# Patient Record
Sex: FEMALE | Race: WHITE | NOT HISPANIC OR LATINO
[De-identification: names, ages, dates, MRNs, and addresses within clinical notes are randomized per-mention and may not be internally consistent; named-entity substitution may affect disease eponyms.]

---

## 2021-12-07 PROBLEM — Z00.00 ENCOUNTER FOR PREVENTIVE HEALTH EXAMINATION: Status: ACTIVE | Noted: 2021-12-07

## 2021-12-08 ENCOUNTER — NON-APPOINTMENT (OUTPATIENT)
Age: 31
End: 2021-12-08

## 2021-12-09 ENCOUNTER — NON-APPOINTMENT (OUTPATIENT)
Age: 31
End: 2021-12-09

## 2021-12-09 ENCOUNTER — APPOINTMENT (OUTPATIENT)
Dept: RHEUMATOLOGY | Facility: CLINIC | Age: 31
End: 2021-12-09
Payer: MEDICAID

## 2021-12-09 VITALS
OXYGEN SATURATION: 98 % | SYSTOLIC BLOOD PRESSURE: 99 MMHG | HEIGHT: 65 IN | HEART RATE: 71 BPM | DIASTOLIC BLOOD PRESSURE: 66 MMHG | TEMPERATURE: 97.4 F | WEIGHT: 165 LBS | BODY MASS INDEX: 27.49 KG/M2

## 2021-12-09 PROCEDURE — 99203 OFFICE O/P NEW LOW 30 MIN: CPT | Mod: 25

## 2021-12-09 NOTE — HISTORY OF PRESENT ILLNESS
[FreeTextEntry1] : This is a 31-year-old woman she comes with her  she is concerned about the possibility of arthritis she has had psoriasis involving her scalp for a long period of time but more recently has had musculoskeletal complaints she reports that about a year ago she twisted her left foot it hurt in and around the pinky area she eventually went and sore a primary care physician no x-ray was done because at that point she was pregnant she was basically off her feet during pregnancy without any pain but has had return of pain now postpartum she is now 5 months postpartum she is breast-feeding she has had some left foot pain as well as some right foot pain she is also had wrist and hand pain she has had no joint swelling she has not taken any specific medication as noted she is 5 months postpartum she is currently breast-feeding she not on any other medications except for some multivitamins

## 2021-12-09 NOTE — PHYSICAL EXAM
[General Appearance - Alert] : alert [General Appearance - In No Acute Distress] : in no acute distress [General Appearance - Well Nourished] : well nourished [General Appearance - Well Developed] : well developed [Abnormal Walk] : normal gait [Nail Clubbing] : no clubbing  or cyanosis of the fingernails [Musculoskeletal - Swelling] : no joint swelling seen [FreeTextEntry1] : no tender or swollen joints no synovitis no sausage and of digits nail appear normal

## 2021-12-09 NOTE — REVIEW OF SYSTEMS
[Feeling Tired] : feeling tired [As Noted in HPI] : as noted in HPI [Feeling Poorly] : not feeling poorly

## 2021-12-09 NOTE — ASSESSMENT
[FreeTextEntry1] : 31-year-old woman comes with her  she is 5 months postpartum she has a history of psoriasis but has had more recent joint pains arthralgias involving feet and hands no actual swelling no synovitis I am obtaining some blood work as outlined which I will review with patients I did suggest observation at this point she may consider taking ibuprofen if needed but should be checking with child's pediatrician first will be speaking to her when bloods return

## 2021-12-10 LAB
ALBUMIN SERPL ELPH-MCNC: 4.6 G/DL
ALP BLD-CCNC: 78 U/L
ALT SERPL-CCNC: 7 U/L
ANION GAP SERPL CALC-SCNC: 12 MMOL/L
AST SERPL-CCNC: 9 U/L
BASOPHILS # BLD AUTO: 0.02 K/UL
BASOPHILS NFR BLD AUTO: 0.3 %
BILIRUB SERPL-MCNC: 0.4 MG/DL
BUN SERPL-MCNC: 9 MG/DL
CALCIUM SERPL-MCNC: 9.7 MG/DL
CHLORIDE SERPL-SCNC: 104 MMOL/L
CO2 SERPL-SCNC: 25 MMOL/L
CREAT SERPL-MCNC: 0.73 MG/DL
CRP SERPL-MCNC: <3 MG/L
EOSINOPHIL # BLD AUTO: 0.12 K/UL
EOSINOPHIL NFR BLD AUTO: 1.8 %
ERYTHROCYTE [SEDIMENTATION RATE] IN BLOOD BY WESTERGREN METHOD: 13 MM/HR
GLUCOSE SERPL-MCNC: 84 MG/DL
HCT VFR BLD CALC: 44.8 %
HGB BLD-MCNC: 14.4 G/DL
IMM GRANULOCYTES NFR BLD AUTO: 0.3 %
LYMPHOCYTES # BLD AUTO: 2.22 K/UL
LYMPHOCYTES NFR BLD AUTO: 32.4 %
MAN DIFF?: NORMAL
MCHC RBC-ENTMCNC: 30.3 PG
MCHC RBC-ENTMCNC: 32.1 GM/DL
MCV RBC AUTO: 94.1 FL
MONOCYTES # BLD AUTO: 0.43 K/UL
MONOCYTES NFR BLD AUTO: 6.3 %
NEUTROPHILS # BLD AUTO: 4.04 K/UL
NEUTROPHILS NFR BLD AUTO: 58.9 %
PLATELET # BLD AUTO: 232 K/UL
POTASSIUM SERPL-SCNC: 4 MMOL/L
PROT SERPL-MCNC: 6.7 G/DL
RBC # BLD: 4.76 M/UL
RBC # FLD: 12.5 %
RHEUMATOID FACT SER QL: 15 IU/ML
SODIUM SERPL-SCNC: 141 MMOL/L
TSH SERPL-ACNC: 1.16 UIU/ML
WBC # FLD AUTO: 6.85 K/UL

## 2021-12-11 LAB
CCP AB SER IA-ACNC: <8 UNITS
RF+CCP IGG SER-IMP: NEGATIVE

## 2021-12-15 ENCOUNTER — NON-APPOINTMENT (OUTPATIENT)
Age: 31
End: 2021-12-15

## 2022-05-12 ENCOUNTER — APPOINTMENT (OUTPATIENT)
Dept: RHEUMATOLOGY | Facility: CLINIC | Age: 32
End: 2022-05-12
Payer: MEDICAID

## 2022-05-12 ENCOUNTER — NON-APPOINTMENT (OUTPATIENT)
Age: 32
End: 2022-05-12

## 2022-05-12 VITALS
DIASTOLIC BLOOD PRESSURE: 64 MMHG | OXYGEN SATURATION: 98 % | HEIGHT: 63 IN | HEART RATE: 69 BPM | BODY MASS INDEX: 26.75 KG/M2 | WEIGHT: 151 LBS | SYSTOLIC BLOOD PRESSURE: 115 MMHG | TEMPERATURE: 98.4 F

## 2022-05-12 DIAGNOSIS — M25.50 PAIN IN UNSPECIFIED JOINT: ICD-10-CM

## 2022-05-12 PROCEDURE — 99213 OFFICE O/P EST LOW 20 MIN: CPT | Mod: 25

## 2022-05-12 NOTE — DATA REVIEWED
[FreeTextEntry1] : In addition to the above data I did review the x-ray film and report of her left foot this appears normal also reviewed the x-ray of her pelvis and her report did show the possibility of some sclerosis may be degenerative may be sacroiliitis however these changes I think can also be seen in pregnancy she did have 5 pregnancy

## 2022-05-12 NOTE — HISTORY OF PRESENT ILLNESS
[FreeTextEntry1] : pain in foot dilma , neck - all the time - worse with stress a little worse in am - psoriasis a little worse - takes motrin it helps - 10 MONTHS POST PARTUM - SX X 2 YEARS this is a 31-year-old woman she returns for a follow-up visit she is now 10 months postpartum she continues to have musculoskeletal complaints all worse when she is under stress she is breast-feeding she does take occasional ibuprofen which does help a little bit worse pains appear to be in her neck although there is no limitations of neck motion she also has pain in her left foot as well as her lower back area at times again there may be some slight pain in the morning she also has ankle pain and knee pain but has not had any evidence of any swelling or deformities of any joints she remains concerned regarding she did have an x-ray done of her left foot which I have reviewed she also had an x-ray done of her SI joints which I have also reviewed or her pelvis she has been given some topicals for psoriasis she apparently has it in her scalp and head and neck area she has additional concerns regarding use of functional medicines and functional medication approaches she does report in the past when she changed her diet and increased activity there was improvement in symptoms again her musculoskeletal symptoms have been longstanding

## 2022-05-12 NOTE — PHYSICAL EXAM
[General Appearance - Alert] : alert [General Appearance - In No Acute Distress] : in no acute distress [General Appearance - Well Nourished] : well nourished [General Appearance - Well Developed] : well developed [Neck Appearance] : the appearance of the neck was normal [] : the neck was supple [Nail Clubbing] : no clubbing  or cyanosis of the fingernails [Musculoskeletal - Swelling] : no joint swelling seen [FreeTextEntry1] : No nail change

## 2022-05-12 NOTE — ASSESSMENT
[FreeTextEntry1] : This is a 31-year-old woman she returns again with her  she has had a history of psoriasis in the past mostly in her scalp no nail changes she has longstanding musculoskeletal complaints fairly widespread in hands feet knees and ankles as well as worse pain in her neck she has had no deformities or evidence of inflamed swollen joints she does take Motrin although limited use because she is breast-feeding but this does provide some relief she had blood work done by me about 6 months ago which showed negative sed rate and CRP rheumatoid factor was borderline positive not significant thyroid studies were normal she comes again now because she had x-rays of her foot which is normal she had x-rays of her pelvis which raised the possibility of some sclerosis of the SI joints however I suspect that this may be suggest reflecting her history of 5 pregnancies and now she is 10 months postpartum for completeness I am doing an HLA-B27 although the significance of this may be limited and I reviewed this with her I am also repeating a sed rate and CRP I will be reviewing all of these when they return I did suggest there may be a component of a fibromyalgia-like process I suggested we may consider physical therapy at [facility] for her neck I also suggested that she may try the functional medications or functional approach that she has been told about I will be reviewing bloods with her when they return these bloods will be obtained in the office at the time of visit I also advised her to try to do some relaxation exercises as she describes worse pain when she is under stress

## 2022-05-12 NOTE — REVIEW OF SYSTEMS
[Feeling Tired] : feeling tired [Arthralgias] : arthralgias [Joint Pain] : joint pain [As Noted in HPI] : as noted in HPI [Joint Swelling] : no joint swelling [FreeTextEntry2] : Poor sleeping [de-identified] : Psoriasis

## 2022-05-13 LAB
CRP SERPL-MCNC: <3 MG/L
ERYTHROCYTE [SEDIMENTATION RATE] IN BLOOD BY WESTERGREN METHOD: 9 MM/HR

## 2022-05-19 ENCOUNTER — NON-APPOINTMENT (OUTPATIENT)
Age: 32
End: 2022-05-19

## 2022-05-20 LAB — HLA-B27 RELATED AG QL: NEGATIVE

## 2022-05-25 ENCOUNTER — NON-APPOINTMENT (OUTPATIENT)
Age: 32
End: 2022-05-25

## 2022-06-02 ENCOUNTER — TRANSCRIPTION ENCOUNTER (OUTPATIENT)
Age: 32
End: 2022-06-02

## 2023-11-06 ENCOUNTER — RESULT REVIEW (OUTPATIENT)
Age: 33
End: 2023-11-06

## 2023-11-07 ENCOUNTER — APPOINTMENT (OUTPATIENT)
Dept: PULMONOLOGY | Facility: CLINIC | Age: 33
End: 2023-11-07
Payer: MEDICAID

## 2023-11-07 VITALS
RESPIRATION RATE: 16 BRPM | OXYGEN SATURATION: 99 % | SYSTOLIC BLOOD PRESSURE: 122 MMHG | TEMPERATURE: 98.1 F | HEART RATE: 98 BPM | DIASTOLIC BLOOD PRESSURE: 78 MMHG

## 2023-11-07 DIAGNOSIS — Z82.5 FAMILY HISTORY OF ASTHMA AND OTHER CHRONIC LOWER RESPIRATORY DISEASES: ICD-10-CM

## 2023-11-07 DIAGNOSIS — R06.02 SHORTNESS OF BREATH: ICD-10-CM

## 2023-11-07 PROCEDURE — 99204 OFFICE O/P NEW MOD 45 MIN: CPT
